# Patient Record
Sex: MALE | Race: WHITE | URBAN - NONMETROPOLITAN AREA
[De-identification: names, ages, dates, MRNs, and addresses within clinical notes are randomized per-mention and may not be internally consistent; named-entity substitution may affect disease eponyms.]

---

## 2019-04-02 ENCOUNTER — OFFICE VISIT (OUTPATIENT)
Dept: INTERNAL MEDICINE CLINIC | Age: 19
End: 2019-04-02
Payer: COMMERCIAL

## 2019-04-02 VITALS
BODY MASS INDEX: 25.46 KG/M2 | HEIGHT: 68 IN | SYSTOLIC BLOOD PRESSURE: 116 MMHG | WEIGHT: 168 LBS | DIASTOLIC BLOOD PRESSURE: 78 MMHG | HEART RATE: 70 BPM

## 2019-04-02 DIAGNOSIS — L70.9 ACNE, UNSPECIFIED ACNE TYPE: Primary | ICD-10-CM

## 2019-04-02 PROCEDURE — 99203 OFFICE O/P NEW LOW 30 MIN: CPT | Performed by: INTERNAL MEDICINE

## 2019-04-02 RX ORDER — MINOCYCLINE HYDROCHLORIDE 100 MG/1
CAPSULE ORAL
Refills: 0 | COMMUNITY
Start: 2019-03-26

## 2019-04-02 RX ORDER — CLINDAMYCIN PHOSPHATE 10 MG/G
GEL TOPICAL
Refills: 0 | COMMUNITY
Start: 2019-03-26

## 2019-04-02 SDOH — HEALTH STABILITY: MENTAL HEALTH: HOW OFTEN DO YOU HAVE A DRINK CONTAINING ALCOHOL?: NEVER

## 2019-04-02 ASSESSMENT — PATIENT HEALTH QUESTIONNAIRE - PHQ9
1. LITTLE INTEREST OR PLEASURE IN DOING THINGS: 0
SUM OF ALL RESPONSES TO PHQ QUESTIONS 1-9: 0
2. FEELING DOWN, DEPRESSED OR HOPELESS: 0
SUM OF ALL RESPONSES TO PHQ9 QUESTIONS 1 & 2: 0
SUM OF ALL RESPONSES TO PHQ QUESTIONS 1-9: 0

## 2019-04-02 NOTE — PROGRESS NOTES
2000    Chief Complaint   Patient presents with   Pearline Meckel Doctor     establish care        Pt is a 22 y.o. male who presents for initial visit to establish PCP. Seeing Dr. Ronna Love for acne. He is not happy with results. 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 Need to get info from Dr. Dyke Opitz for immunization/lab. He declines flu vaccine. Past Medical History:   Diagnosis Date    Acne     Dr. Ronna Love       Past Surgical History:   Procedure Laterality Date    WISDOM TOOTH EXTRACTION  2017       Current Outpatient Medications   Medication Sig Dispense Refill    minocycline (MINOCIN;DYNACIN) 100 MG capsule TAKE ONE CAPSULE BY MOUTH AT BEDTIME  0    clindamycin (CLINDAGEL) 1 % gel APPLY EVERY MORNING TO ACNE  0     No current facility-administered medications for this visit.         No Known Allergies    Social History     Socioeconomic History    Marital status: Single     Spouse name: Not on file    Number of children: Not on file    Years of education: Not on file    Highest education level: Not on file   Occupational History    Not on file   Social Needs    Financial resource strain: Not on file    Food insecurity:     Worry: Not on file     Inability: Not on file    Transportation needs:     Medical: Not on file     Non-medical: Not on file   Tobacco Use    Smoking status: Never Smoker    Smokeless tobacco: Never Used   Substance and Sexual Activity    Alcohol use: Never     Frequency: Never    Drug use: Never    Sexual activity: Never     Partners: Female   Lifestyle    Physical activity:     Days per week: Not on file     Minutes per session: Not on file    Stress: Not on file   Relationships    Social connections:     Talks on phone: Not on file     Gets together: Not on file     Attends [employer] service: Not on file     Active member of club or organization: Not on file     Attends meetings of clubs or organizations: Not on file     Relationship status: Not on file    Intimate partner violence:     Fear of current or ex partner: Not on file     Emotionally abused: Not on file     Physically abused: Not on file     Forced sexual activity: Not on file   Other Topics Concern    Not on file   Social History Narrative    Not on file       History reviewed. No pertinent family history. Review of Systems - General ROS: negative for - chills or fever  Psychological ROS: negative for - anxiety and depression  Hematological and Lymphatic ROS: No history of blood clots or bleeding disorder. Respiratory ROS: no cough, shortness of breath, or wheezing  Cardiovascular ROS: no chest pain or dyspnea on exertion, tolerates a flight of stairs, vacuuming  Gastrointestinal ROS: no abdominal pain, change in bowel habits, or black or bloody stools  Genito-Urinary ROS: no dysuria, trouble voiding, or hematuria  Musculoskeletal ROS: negative for - muscle pain or muscular weakness, or joint pain  Neurological ROS: negative for - headaches, numbness/tingling, seizures or weakness  Dermatological ROS: negative for - rash or skin lesion changes    Blood pressure 116/78, pulse 70, height 5' 7.5\" (1.715 m), weight 168 lb (76.2 kg). Physical Examination: General appearance -alert, well appearing, and in no distress  Mental status - alert, oriented to person, place, and time  Head - atraumatic, normocephalic  Eyes - pupils equal and reactive to light, extraocular muscles intact  Ears - external ears are normal, hearing is grossly normal  Mouth - oral pharynx clear, mucous membranes moist  Neck - supple, no significant adenopathy  Chest - clear to auscultation, no wheezes, rales or rhonchi, symmetric air entry  Heart - normal rate, regular rhythm, normal S1, S2, no murmurs, rubs, clicks or gallops  Abdomen -soft, nontender, nondistended  Neurological - alert, oriented, cranial nerves II-XII intact, motor and sensation are grossly intact bilateral upper and lower extremities.   Extremities - peripheral pulses normal, no pedal edema, no clubbing or cyanosis  Skin - warm and dry    Diagnostic data:   I have reviewed recent diagnostic testing including labs - unable to read lipid panel results - will request new report. Assessment and Plan:      Diagnosis Orders   1. Acne, unspecified acne type       Acne - encouraged him to discuss options with dermatology if not satisfied. Follow up in one year. Review immunizations then. Declined HIV testing. Blaine Apodaca MD    [address] [facility]  [address] [place] [address]  [address]  [address]  Dept: [phone]  Dept Fax: [phone] : [phone]